# Patient Record
Sex: FEMALE | URBAN - METROPOLITAN AREA
[De-identification: names, ages, dates, MRNs, and addresses within clinical notes are randomized per-mention and may not be internally consistent; named-entity substitution may affect disease eponyms.]

---
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
## 2024-02-21 NOTE — PROGRESS NOTES
Manjinder Eric is a 18 y.o. female presents for a problem visit.    No chief complaint on file.    No LMP recorded.  Birth Control: abstinence.  Last Pap: not of age.    The patient is reporting having: Vaginal Discharge and itching  for {numbers:6} {days/wks/mos/yrs:514001}.  She reports the symptoms are {BETTER/WORSE:65568}.

## 2024-02-23 ENCOUNTER — OFFICE VISIT (OUTPATIENT)
Age: 19
End: 2024-02-23
Payer: COMMERCIAL

## 2024-02-23 VITALS
SYSTOLIC BLOOD PRESSURE: 116 MMHG | HEIGHT: 59 IN | BODY MASS INDEX: 20.76 KG/M2 | DIASTOLIC BLOOD PRESSURE: 76 MMHG | WEIGHT: 103 LBS | HEART RATE: 80 BPM

## 2024-02-23 DIAGNOSIS — N89.8 VAGINAL DISCHARGE: ICD-10-CM

## 2024-02-23 DIAGNOSIS — N89.8 VAGINA ITCHING: ICD-10-CM

## 2024-02-23 DIAGNOSIS — B37.31 VULVOVAGINITIS DUE TO YEAST: Primary | ICD-10-CM

## 2024-02-23 PROCEDURE — 99213 OFFICE O/P EST LOW 20 MIN: CPT | Performed by: OBSTETRICS & GYNECOLOGY

## 2024-02-23 RX ORDER — FLUCONAZOLE 150 MG/1
150 TABLET ORAL ONCE
Qty: 1 TABLET | Refills: 0 | Status: SHIPPED | OUTPATIENT
Start: 2024-02-23 | End: 2024-02-23

## 2024-02-23 SDOH — ECONOMIC STABILITY: INCOME INSECURITY: HOW HARD IS IT FOR YOU TO PAY FOR THE VERY BASICS LIKE FOOD, HOUSING, MEDICAL CARE, AND HEATING?: NOT HARD AT ALL

## 2024-02-23 SDOH — ECONOMIC STABILITY: HOUSING INSECURITY
IN THE LAST 12 MONTHS, WAS THERE A TIME WHEN YOU DID NOT HAVE A STEADY PLACE TO SLEEP OR SLEPT IN A SHELTER (INCLUDING NOW)?: NO

## 2024-02-23 SDOH — ECONOMIC STABILITY: FOOD INSECURITY: WITHIN THE PAST 12 MONTHS, YOU WORRIED THAT YOUR FOOD WOULD RUN OUT BEFORE YOU GOT MONEY TO BUY MORE.: NEVER TRUE

## 2024-02-23 SDOH — ECONOMIC STABILITY: TRANSPORTATION INSECURITY
IN THE PAST 12 MONTHS, HAS LACK OF TRANSPORTATION KEPT YOU FROM MEETINGS, WORK, OR FROM GETTING THINGS NEEDED FOR DAILY LIVING?: NO

## 2024-02-23 SDOH — ECONOMIC STABILITY: FOOD INSECURITY: WITHIN THE PAST 12 MONTHS, THE FOOD YOU BOUGHT JUST DIDN'T LAST AND YOU DIDN'T HAVE MONEY TO GET MORE.: NEVER TRUE

## 2024-02-23 NOTE — PROGRESS NOTES
Manjinder Eric is a 18 y.o. female presents for a problem visit.    Chief Complaint   Patient presents with    Vaginal Itching    Vaginal Discharge       Patient's last menstrual period was 01/28/2024 (approximate).  Birth Control: abstinence.  Last Pap: not of age.    The patient is reporting having: Vaginal Discharge and itching    Since beginning of January 2024, has been having internal and external vaginal itching.  Went to PCP 1 month ago, who advised pt it was a fungal issue and prescribed a ointment, rash went away after using.  After most recent cycle, she started having itching and discharge, slightly yellow in color.   Denies having any burning.   She reports the symptoms are is unchanged.  Never been SA.

## 2024-02-23 NOTE — PROGRESS NOTES
OB/GYN Problem Visit        HPI  Manjinder Eric is a ,  18 y.o. female who presents for a problem visit.   Patient's last menstrual period was 2024 (approximate).        The patient is reporting having: Vaginal Discharge and itching    Since beginning of 2024, has been having internal and external vaginal itching.  Went to PCP 1 month ago, who advised pt it was a fungal issue and prescribed a ointment, rash went away after using.  External only.  Did not treat intravaginally.  After most recent cycle, she started having itching and discharge, slightly yellow in color.   Denies having any burning.   She reports the symptoms are is unchanged.  Never been SA.  Cannot use tampon.      Past Medical History:   Diagnosis Date    Dysmenorrhea treated with oral contraceptive      History reviewed. No pertinent surgical history.  OB History    Para Term  AB Living   0 0 0 0 0 0   SAB IAB Ectopic Molar Multiple Live Births   0 0 0 0 0 0       Social History     Occupational History    Not on file   Tobacco Use    Smoking status: Never    Smokeless tobacco: Never   Vaping Use    Vaping Use: Never used   Substance and Sexual Activity    Alcohol use: Never    Drug use: Never    Sexual activity: Never     Family History   Problem Relation Age of Onset    Breast Cancer Maternal Great Grandmother              Diabetes Paternal Grandmother     Diabetes Paternal Grandfather     Hypertension Paternal Grandfather     Stroke Paternal Grandfather        Allergies   Allergen Reactions    Seasonal Itching, Rash, Shortness Of Breath and Swelling     Prior to Admission medications    Medication Sig Start Date End Date Taking? Authorizing Provider   fluconazole (DIFLUCAN) 150 MG tablet Take 1 tablet by mouth once for 1 dose 24 Yes Jenifer Woodward MD   LO LOESTRIN FE 1 MG-10 MCG / 10 MCG tablet Take 1 tablet by mouth daily 8/3/23   Jenifer Woodward MD            Objective:  BP

## 2024-08-05 RX ORDER — NORETHINDRONE ACETATE AND ETHINYL ESTRADIOL, ETHINYL ESTRADIOL AND FERROUS FUMARATE 1MG-10(24)
1 KIT ORAL DAILY
Qty: 3 PACKET | Refills: 4 | Status: CANCELLED | OUTPATIENT
Start: 2024-08-05

## 2024-08-05 NOTE — PROGRESS NOTES
Per Nursing Note:     Manjinder Eric is a 18 y.o. female returns for an annual exam          Chief Complaint   Patient presents with    Annual Exam         Patient's last menstrual period was 2024.  Her periods are heavy in flow and usually regular with a 26-32 day interval with 3-7 day duration.  She has dysmenorrhea.  Problems: hx of Lo Loestrin Fe but stopped d/t mood changes, cycles heavy & painful since stopping. Pt reports 23 she had a yeast infection & got better after difluican but now ~1 x per week she has external vaginal itching, pt declines pelvic exam unless Dr. Woodward believes it is necessary. Pt has never been SA.   Birth Control: abstinence.  Last Pap: never obtianed d/t age.   She does not have a history of OCHOA 2, 3 or cervical cancer.   With regard to the Gardisil vaccine, she has received all 3 injections            Annual exam      Chief Complaint   Patient presents with    Annual Exam       Manjinder Eric is a ,  18 y.o. female   Patient's last menstrual period was 2024.  She presents for her annual checkup.   AE (NP) = 8/3/2023  LV = 2024 for yeast infection, treated with p.o. Diflucan    Will be starting her [employer] year at [employer].  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  Wants to go to [employer].  Just started working at the [employer] at [employer].    Cycles regular, but had dysmenorrhea and menorrhagia. Had been on LoLoestrin since 2022. Had good cycle control but stopped taking 2023 d/t mood changes.  Mood improved once OCPs stopped.    She is  having gyn problems.    Vulvar itching.  Treated for yeast infection with Diflucan x 1 in February.  Was having chunky white vaginal discharge and external itching.  Symptoms resolved with the Diflucan.  Now reports similar vulvar itching although denies any vaginal discharge.    Does use Always brand pads.    Menstrual status:    Periods regular  Every 4 weeks  Duration: 5-7d  Flow: heavy, changes pad every 1.5-2hrs  Dysmenorrhea:

## 2024-08-05 NOTE — PROGRESS NOTES
Manjinder Eric is a 18 y.o. female returns for an annual exam     Chief Complaint   Patient presents with    Annual Exam       Patient's last menstrual period was 07/11/2024.  Her periods are heavy in flow and usually regular with a 26-32 day interval with 3-7 day duration.  She has dysmenorrhea.  Problems: hx of Lo Loestrin Fe but stopped d/t mood changes, cycles heavy & painful since stopping. Pt reports 2/23/24 she had a yeast infection & got better after difluican but now ~1 x per week she has external vaginal itching, pt declines pelvic exam unless Dr. Woodward believes it is necessary. Pt has never been SA.   Birth Control: abstinence.  Last Pap: never obtianed d/t age.   She does not have a history of OCHOA 2, 3 or cervical cancer.   With regard to the Gardisil vaccine, she has received all 3 injections        Examination chaperoned by Kate Cárdenas MA.

## 2024-08-06 ENCOUNTER — OFFICE VISIT (OUTPATIENT)
Age: 19
End: 2024-08-06
Payer: COMMERCIAL

## 2024-08-06 VITALS
DIASTOLIC BLOOD PRESSURE: 71 MMHG | HEART RATE: 73 BPM | WEIGHT: 100.6 LBS | HEIGHT: 60 IN | SYSTOLIC BLOOD PRESSURE: 116 MMHG | BODY MASS INDEX: 19.75 KG/M2

## 2024-08-06 DIAGNOSIS — L29.2 VULVAR ITCHING: ICD-10-CM

## 2024-08-06 DIAGNOSIS — N92.0 MENORRHAGIA WITH REGULAR CYCLE: ICD-10-CM

## 2024-08-06 DIAGNOSIS — Z01.419 ENCOUNTER FOR WELL WOMAN EXAM WITH ROUTINE GYNECOLOGICAL EXAM: Primary | ICD-10-CM

## 2024-08-06 DIAGNOSIS — N94.6 DYSMENORRHEA: ICD-10-CM

## 2024-08-06 PROCEDURE — 99395 PREV VISIT EST AGE 18-39: CPT | Performed by: OBSTETRICS & GYNECOLOGY

## 2024-08-06 RX ORDER — ADAPALENE GEL USP, 0.3% 3 MG/G
GEL TOPICAL
COMMUNITY
Start: 2024-07-08

## 2024-08-06 RX ORDER — TRANEXAMIC ACID 650 MG/1
TABLET ORAL
Qty: 30 TABLET | Refills: 12 | Status: SHIPPED | OUTPATIENT
Start: 2024-08-06

## 2024-08-06 NOTE — PATIENT INSTRUCTIONS
Fiber:  Goal of 40-45 gm/d.  The average American diet is 8-15 gm/d.  Do a food diary for a couple of weeks to track your fiber intake (you will need to measure portions for this).  Once you know your baseline, gradually increase your intake.  Increase by about 5gm per week.  If you increase too quickly you can have bloating, cramping, gas.    Sheri:  - tea twice a day  OR  - 2gm/d (split out over 2-3 doses), can be powder or dietary form)    Ibuprofen 600mg every 6hrs or 800mg every 8hrs  Or Aleve (naproxen) twice a day.  Start 24-48 hrs BEFORE you expect the cramping to start.    Magnesium 200-400mg daily    This is great resource:  [email or web address]